# Patient Record
(demographics unavailable — no encounter records)

---

## 2025-07-08 NOTE — REVIEW OF SYSTEMS
[Fatigue] : fatigue [Dysphagia] : no dysphagia [Dysphonia] : no dysphonia [Confusion] : no confusion [Tremors] : no tremors [Depression] : depression [Anxiety] : anxiety [Negative] : Heme/Lymph

## 2025-07-08 NOTE — ASSESSMENT
[FreeTextEntry1] : A.m. white female who has a past medical history of longstanding multinodular goiter for which she had undergone fine-needle aspiration biopsies on both sides.  The last biopsy was done on the right nodule on January 2022 and was reported as benign.  Her last sonogram of the thyroid was in January 2024 which had not shown any significant changes in the size of the thyroid nodules.  Recommendation 1.  I have instructed the patient to obtain a repeat blood test to check her thyroid profile and also I will refer the patient for a follow-up sonogram study of the thyroid. 2.  If clinically stable then the patient will continue to be observed clinically on an annual basis. 3.  The plan was discussed in detail with the patient.  Thank you

## 2025-07-08 NOTE — PHYSICAL EXAM
[Alert] : alert [Well Nourished] : well nourished [No Acute Distress] : no acute distress [Well Developed] : well developed [Normal Sclera/Conjunctiva] : normal sclera/conjunctiva [EOMI] : extra ocular movement intact [No Proptosis] : no proptosis [Normal Oropharynx] : the oropharynx was normal [No Thyroid Nodules] : no palpable thyroid nodules [No Respiratory Distress] : no respiratory distress [No Accessory Muscle Use] : no accessory muscle use [Clear to Auscultation] : lungs were clear to auscultation bilaterally [Normal S1, S2] : normal S1 and S2 [Normal Rate] : heart rate was normal [Regular Rhythm] : with a regular rhythm [No Edema] : no peripheral edema [Pedal Pulses Normal] : the pedal pulses are present [Normal Bowel Sounds] : normal bowel sounds [Not Tender] : non-tender [Not Distended] : not distended [Soft] : abdomen soft [Normal Anterior Cervical Nodes] : no anterior cervical lymphadenopathy [Normal Posterior Cervical Nodes] : no posterior cervical lymphadenopathy [No Spinal Tenderness] : no spinal tenderness [Spine Straight] : spine straight [No Stigmata of Cushings Syndrome] : no stigmata of Cushings Syndrome [Normal Gait] : normal gait [Normal Strength/Tone] : muscle strength and tone were normal [No Rash] : no rash [Acanthosis Nigricans] : no acanthosis nigricans [Normal Reflexes] : deep tendon reflexes were 2+ and symmetric [No Tremors] : no tremors [Oriented x3] : oriented to person, place, and time [de-identified] : Patient BMI is 25.51 and her weight is 144 pounds [de-identified] : Bilaterally enlarged thyroid with palpable nodules in the right middle pole in the left lower pole smooth and nontender and solid.  No palpable lymph nodes or audible bruit

## 2025-07-08 NOTE — HISTORY OF PRESENT ILLNESS
[FreeTextEntry1] : 49-year-old white female who has a past medical history of multinodular goiter and anxiety presents for routine follow-up.  Patient denies any significant complaints but she recently lost her spouse to cancer and she has been feeling slightly depressed.  Currently the patient is only on low Loestrin and if she is off the Lexapro patient denies any significant symptoms of neck pain, hoarseness or difficulty in swallowing.  She has not noticed any chest pain shortness of breath.  She did lose a few pounds as she has been walking daily with her dog.  Her appetite is well-preserved.  She denies any skin rash or joint pains or any excessive hair loss.  Review of systems otherwise negative.